# Patient Record
Sex: MALE | Race: WHITE | Employment: OTHER | URBAN - METROPOLITAN AREA
[De-identification: names, ages, dates, MRNs, and addresses within clinical notes are randomized per-mention and may not be internally consistent; named-entity substitution may affect disease eponyms.]

---

## 2020-01-21 ENCOUNTER — APPOINTMENT (OUTPATIENT)
Dept: CT IMAGING | Facility: CLINIC | Age: 70
End: 2020-01-21
Attending: FAMILY MEDICINE
Payer: COMMERCIAL

## 2020-01-21 ENCOUNTER — APPOINTMENT (OUTPATIENT)
Dept: GENERAL RADIOLOGY | Facility: CLINIC | Age: 70
End: 2020-01-21
Attending: FAMILY MEDICINE
Payer: COMMERCIAL

## 2020-01-21 ENCOUNTER — HOSPITAL ENCOUNTER (EMERGENCY)
Facility: CLINIC | Age: 70
Discharge: HOME | End: 2020-01-21
Attending: FAMILY MEDICINE | Admitting: FAMILY MEDICINE
Payer: COMMERCIAL

## 2020-01-21 VITALS
DIASTOLIC BLOOD PRESSURE: 85 MMHG | SYSTOLIC BLOOD PRESSURE: 151 MMHG | RESPIRATION RATE: 16 BRPM | HEART RATE: 55 BPM | TEMPERATURE: 97.7 F | HEIGHT: 68 IN | BODY MASS INDEX: 45.47 KG/M2 | WEIGHT: 300 LBS | OXYGEN SATURATION: 93 %

## 2020-01-21 DIAGNOSIS — M54.50 ACUTE LEFT-SIDED LOW BACK PAIN WITHOUT SCIATICA: ICD-10-CM

## 2020-01-21 LAB
ALBUMIN UR-MCNC: 100 MG/DL
APPEARANCE UR: CLEAR
BILIRUB UR QL STRIP: NEGATIVE
COLOR UR AUTO: YELLOW
GLUCOSE UR STRIP-MCNC: NEGATIVE MG/DL
HGB UR QL STRIP: NEGATIVE
KETONES UR STRIP-MCNC: NEGATIVE MG/DL
LEUKOCYTE ESTERASE UR QL STRIP: NEGATIVE
MUCOUS THREADS #/AREA URNS LPF: PRESENT /LPF
NITRATE UR QL: NEGATIVE
PH UR STRIP: 5 PH (ref 5–7)
RBC #/AREA URNS AUTO: <1 /HPF (ref 0–2)
SOURCE: ABNORMAL
SP GR UR STRIP: 1.02 (ref 1–1.03)
SQUAMOUS #/AREA URNS AUTO: 1 /HPF (ref 0–1)
UROBILINOGEN UR STRIP-MCNC: 0 MG/DL (ref 0–2)
WBC #/AREA URNS AUTO: 1 /HPF (ref 0–5)

## 2020-01-21 PROCEDURE — 72100 X-RAY EXAM L-S SPINE 2/3 VWS: CPT

## 2020-01-21 PROCEDURE — 81001 URINALYSIS AUTO W/SCOPE: CPT | Performed by: FAMILY MEDICINE

## 2020-01-21 PROCEDURE — 25000132 ZZH RX MED GY IP 250 OP 250 PS 637: Performed by: FAMILY MEDICINE

## 2020-01-21 PROCEDURE — 99285 EMERGENCY DEPT VISIT HI MDM: CPT | Mod: Z6 | Performed by: FAMILY MEDICINE

## 2020-01-21 PROCEDURE — 99285 EMERGENCY DEPT VISIT HI MDM: CPT | Mod: 25 | Performed by: FAMILY MEDICINE

## 2020-01-21 PROCEDURE — 74176 CT ABD & PELVIS W/O CONTRAST: CPT

## 2020-01-21 RX ORDER — OXYCODONE HYDROCHLORIDE 5 MG/1
10 TABLET ORAL ONCE
Status: COMPLETED | OUTPATIENT
Start: 2020-01-21 | End: 2020-01-21

## 2020-01-21 RX ORDER — OXYCODONE HYDROCHLORIDE 5 MG/1
5-10 TABLET ORAL EVERY 6 HOURS PRN
Qty: 12 TABLET | Refills: 0 | Status: SHIPPED | OUTPATIENT
Start: 2020-01-21

## 2020-01-21 RX ADMIN — OXYCODONE HYDROCHLORIDE 10 MG: 5 TABLET ORAL at 10:17

## 2020-01-21 ASSESSMENT — MIFFLIN-ST. JEOR: SCORE: 2110.29

## 2020-01-21 NOTE — ED PROVIDER NOTES
"  HPI   The patient is a 69-year-old male presenting with left lower back/flank pain.  Symptoms began about 5 days ago.  He has had constant pain since onset.  His pain is severe with movement.  His pain is moderate when he is at rest.  He denies radiating symptoms into his groin or abdomen.  He denies radiating symptoms into his left buttocks or left lower extremity.  He denies new urinary symptoms.  No testicular pain, tenderness, or swelling.  No discharge from the penis.  No fever.  No chest pain.  He tells me it hurts to take a deep breath.  No cough or congestion.  No hemoptysis.  No shortness of breath at rest.        Allergies:  No Known Allergies  Problem List:    There are no active problems to display for this patient.     Past Medical History:    No past medical history on file.  Past Surgical History:    No past surgical history on file.  Family History:    No family history on file.  Social History:  Marital Status:   [2]  Social History     Tobacco Use     Smoking status: Not on file   Substance Use Topics     Alcohol use: Not on file     Drug use: Not on file      Medications:    oxyCODONE (ROXICODONE) 5 MG tablet      Review of Systems   All other systems reviewed and are negative.      PE   BP: (!) 186/84(has not taken his daily meds yet )  Heart Rate: 59  Temp: 97.7  F (36.5  C)  Resp: 16  Height: 172.7 cm (5' 8\")  Weight: 136.1 kg (300 lb)  SpO2: 98 %  Physical Exam  Vitals signs and nursing note reviewed.   Constitutional:       General: He is in acute distress.      Appearance: He is not diaphoretic.   HENT:      Head: Atraumatic.   Eyes:      General: No scleral icterus.     Pupils: Pupils are equal, round, and reactive to light.   Neck:      Musculoskeletal: Normal range of motion.   Cardiovascular:      Heart sounds: Normal heart sounds.   Pulmonary:      Effort: No respiratory distress.      Breath sounds: Normal breath sounds.   Abdominal:      General: Bowel sounds are normal.      " Palpations: Abdomen is soft.      Tenderness: There is no abdominal tenderness.   Musculoskeletal: Normal range of motion.         General: Tenderness present.      Comments: The patient has some left low back muscular tenderness.  No midline tenderness.  The pain is worsened when he tries to bend forward.   Skin:     General: Skin is warm.      Findings: No rash.   Neurological:      Mental Status: He is alert and oriented to person, place, and time.   Psychiatric:         Behavior: Behavior normal.         ED COURSE and MDM   0927.  The patient has left low back tenderness and pain that is worse with movement.  Urine analysis pending.  Lumbar x-ray pending.    1014.  I reviewed the x-ray and urine analysis results with the patient.  He is requesting to get a CT scan looking specifically for ureteral stones.  Order placed.  Oxycodone ordered as well.    1143.  CT scan is unremarkable for acute cause of pain.  Follow-up discussed.  Oxycodone prescribed, 12 tablets.  I suspect his pain is musculoskeletal in origin.    LABS  Labs Ordered and Resulted from Time of ED Arrival Up to the Time of Departure from the ED   ROUTINE UA WITH MICROSCOPIC REFLEX TO CULTURE - Abnormal; Notable for the following components:       Result Value    Protein Albumin Urine 100 (*)     Mucous Urine Present (*)     All other components within normal limits       IMAGING  Images reviewed by me.  Radiology report also reviewed.  CT Abdomen Pelvis w/o Contrast   Preliminary Result   IMPRESSION:   1. No radiodense kidney stones or hydronephrosis in either kidney.   2. Prostatomegaly.   3. Cholelithiasis without CT evidence of acute cholecystitis.      XR Lumbar Spine 2/3 Views   Final Result   IMPRESSION: Multilevel degenerative changes.      PAULINA EDMONDS MD          Procedures    Medications   oxyCODONE (ROXICODONE) tablet 10 mg (10 mg Oral Given 1/21/20 1017)         IMPRESSION       ICD-10-CM    1. Acute left-sided low back pain without  sciatica M54.5             Medication List      Started    oxyCODONE 5 MG tablet  Commonly known as:  ROXICODONE  5-10 mg, Oral, EVERY 6 HOURS PRN, Do not exceed 6 tablets over the course of 24 hours.                          Jerry Barksdale MD  01/21/20 1144

## 2020-01-21 NOTE — ED NOTES
Left flank pain 5 days duration  States it came on gradually and now worsening especially with movement, no physical explanation of pain  Pain does not radiate  No nausea or vomiting. Normal BMs  Constant and sharp 10/10  Taking 800-1200mg ibuprofen daily in one dose with one 500mg Tylenol

## 2020-01-21 NOTE — DISCHARGE INSTRUCTIONS
Return to the Emergency Room if the following occurs:     Worsened pain in the back, new abdominal pain, fever, difficulty with bowel or bladder, new weakness of your legs that doesn't go away, or for any concern at anytime.    Or, follow-up with the following provider as we discussed:     Return to your primary doctor as discussed.    Medications discussed:    Oxycodone 1-2 tablets every six hours as needed for pain control that is not relieved by the above.  MiraLax OTC.  Titrate the medicine to achieve a soft, easy stool every 1-2 days.    If you received pain-relieving or sedating medication during your time in the ER, avoid alcohol, driving automobiles, or working with machinery.  Also, a responsible adult must stay with you.        Call the Nurse Advice Line at [phone] or [phone] for any concern at anytime.

## 2020-01-21 NOTE — ED AVS SNAPSHOT
[facility] Emergency Department  [address] [address]  Phone:  [phone]  Fax:  [phone]                                    Geoffrey Donaldson   MRN: [id number]    Department:  [facility] Emergency Department   Date of Visit:  1/21/2020           After Visit Summary Signature Page    I have received my discharge instructions, and my questions have been answered. I have discussed any challenges I see with this plan with the nurse or doctor.    ..........................................................................................................................................  Patient/Patient Representative Signature      ..........................................................................................................................................  Patient Representative Print Name and Relationship to Patient    ..................................................               ................................................  Date                                   Time    ..........................................................................................................................................  Reviewed by Signature/Title    ...................................................              ..............................................  Date                                               Time          22EPIC Rev 08/18